# Patient Record
Sex: FEMALE | Race: OTHER | HISPANIC OR LATINO | URBAN - METROPOLITAN AREA
[De-identification: names, ages, dates, MRNs, and addresses within clinical notes are randomized per-mention and may not be internally consistent; named-entity substitution may affect disease eponyms.]

---

## 2019-07-07 ENCOUNTER — INPATIENT (INPATIENT)
Facility: HOSPITAL | Age: 24
LOS: 2 days | Discharge: HOME | End: 2019-07-10
Attending: OBSTETRICS & GYNECOLOGY | Admitting: OBSTETRICS & GYNECOLOGY
Payer: MEDICAID

## 2019-07-07 VITALS — HEIGHT: 62 IN | WEIGHT: 191.8 LBS

## 2019-07-07 DIAGNOSIS — Z34.80 ENCOUNTER FOR SUPERVISION OF OTHER NORMAL PREGNANCY, UNSPECIFIED TRIMESTER: ICD-10-CM

## 2019-07-07 DIAGNOSIS — O26.899 OTHER SPECIFIED PREGNANCY RELATED CONDITIONS, UNSPECIFIED TRIMESTER: ICD-10-CM

## 2019-07-07 DIAGNOSIS — Z3A.00 WEEKS OF GESTATION OF PREGNANCY NOT SPECIFIED: ICD-10-CM

## 2019-07-07 LAB
APTT BLD: 23.6 SEC — LOW (ref 27.5–36.3)
BASOPHILS # BLD AUTO: 0.02 K/UL — SIGNIFICANT CHANGE UP (ref 0–0.2)
BASOPHILS NFR BLD AUTO: 0.2 % — SIGNIFICANT CHANGE UP (ref 0–2)
EOSINOPHIL # BLD AUTO: 0.07 K/UL — SIGNIFICANT CHANGE UP (ref 0–0.5)
EOSINOPHIL NFR BLD AUTO: 0.7 % — SIGNIFICANT CHANGE UP (ref 0–6)
FIBRINOGEN PPP-MCNC: 782 MG/DL — HIGH (ref 350–510)
HCT VFR BLD CALC: 39.5 % — SIGNIFICANT CHANGE UP (ref 34.5–45)
HGB BLD-MCNC: 13.5 G/DL — SIGNIFICANT CHANGE UP (ref 11.5–15.5)
IMM GRANULOCYTES NFR BLD AUTO: 0.6 % — SIGNIFICANT CHANGE UP (ref 0–1.5)
INR BLD: 0.96 RATIO — SIGNIFICANT CHANGE UP (ref 0.88–1.16)
LYMPHOCYTES # BLD AUTO: 1.8 K/UL — SIGNIFICANT CHANGE UP (ref 1–3.3)
LYMPHOCYTES # BLD AUTO: 18.2 % — SIGNIFICANT CHANGE UP (ref 13–44)
MCHC RBC-ENTMCNC: 28.9 PG — SIGNIFICANT CHANGE UP (ref 27–34)
MCHC RBC-ENTMCNC: 34.2 GM/DL — SIGNIFICANT CHANGE UP (ref 32–36)
MCV RBC AUTO: 84.6 FL — SIGNIFICANT CHANGE UP (ref 80–100)
MONOCYTES # BLD AUTO: 0.73 K/UL — SIGNIFICANT CHANGE UP (ref 0–0.9)
MONOCYTES NFR BLD AUTO: 7.4 % — SIGNIFICANT CHANGE UP (ref 2–14)
NEUTROPHILS # BLD AUTO: 7.21 K/UL — SIGNIFICANT CHANGE UP (ref 1.8–7.4)
NEUTROPHILS NFR BLD AUTO: 72.9 % — SIGNIFICANT CHANGE UP (ref 43–77)
NRBC # BLD: 0 /100 WBCS — SIGNIFICANT CHANGE UP (ref 0–0)
PLATELET # BLD AUTO: 206 K/UL — SIGNIFICANT CHANGE UP (ref 150–400)
PROTHROM AB SERPL-ACNC: 10.6 SEC — SIGNIFICANT CHANGE UP (ref 10–12.9)
RBC # BLD: 4.67 M/UL — SIGNIFICANT CHANGE UP (ref 3.8–5.2)
RBC # FLD: 14 % — SIGNIFICANT CHANGE UP (ref 10.3–14.5)
WBC # BLD: 9.89 K/UL — SIGNIFICANT CHANGE UP (ref 3.8–10.5)
WBC # FLD AUTO: 9.89 K/UL — SIGNIFICANT CHANGE UP (ref 3.8–10.5)

## 2019-07-07 PROCEDURE — 76818 FETAL BIOPHYS PROFILE W/NST: CPT | Mod: 26

## 2019-07-07 PROCEDURE — 76815 OB US LIMITED FETUS(S): CPT | Mod: 26

## 2019-07-07 RX ORDER — SODIUM CHLORIDE 9 MG/ML
1000 INJECTION, SOLUTION INTRAVENOUS
Refills: 0 | Status: DISCONTINUED | OUTPATIENT
Start: 2019-07-07 | End: 2019-07-08

## 2019-07-07 RX ORDER — OXYTOCIN 10 UNIT/ML
333.33 VIAL (ML) INJECTION
Qty: 20 | Refills: 0 | Status: DISCONTINUED | OUTPATIENT
Start: 2019-07-07 | End: 2019-07-08

## 2019-07-07 RX ADMIN — SODIUM CHLORIDE 125 MILLILITER(S): 9 INJECTION, SOLUTION INTRAVENOUS at 22:10

## 2019-07-08 LAB
ABO RH CONFIRMATION: SIGNIFICANT CHANGE UP
FETAL SCREEN: SIGNIFICANT CHANGE UP
HBV SURFACE AG SERPL QL IA: SIGNIFICANT CHANGE UP
RUBV IGG SER-ACNC: 5.1 INDEX — SIGNIFICANT CHANGE UP
RUBV IGG SER-IMP: POSITIVE — SIGNIFICANT CHANGE UP
T PALLIDUM AB TITR SER: NEGATIVE — SIGNIFICANT CHANGE UP

## 2019-07-08 RX ORDER — OXYTOCIN 10 UNIT/ML
6 VIAL (ML) INJECTION
Qty: 30 | Refills: 0 | Status: DISCONTINUED | OUTPATIENT
Start: 2019-07-08 | End: 2019-07-10

## 2019-07-08 RX ORDER — IBUPROFEN 200 MG
600 TABLET ORAL EVERY 6 HOURS
Refills: 0 | Status: DISCONTINUED | OUTPATIENT
Start: 2019-07-08 | End: 2019-07-10

## 2019-07-08 RX ORDER — ONDANSETRON 8 MG/1
4 TABLET, FILM COATED ORAL EVERY 6 HOURS
Refills: 0 | Status: DISCONTINUED | OUTPATIENT
Start: 2019-07-08 | End: 2019-07-08

## 2019-07-08 RX ORDER — FERROUS SULFATE 325(65) MG
325 TABLET ORAL DAILY
Refills: 0 | Status: DISCONTINUED | OUTPATIENT
Start: 2019-07-08 | End: 2019-07-08

## 2019-07-08 RX ORDER — TETANUS TOXOID, REDUCED DIPHTHERIA TOXOID AND ACELLULAR PERTUSSIS VACCINE, ADSORBED 5; 2.5; 8; 8; 2.5 [IU]/.5ML; [IU]/.5ML; UG/.5ML; UG/.5ML; UG/.5ML
0.5 SUSPENSION INTRAMUSCULAR ONCE
Refills: 0 | Status: DISCONTINUED | OUTPATIENT
Start: 2019-07-08 | End: 2019-07-10

## 2019-07-08 RX ORDER — DOCUSATE SODIUM 100 MG
100 CAPSULE ORAL
Refills: 0 | Status: DISCONTINUED | OUTPATIENT
Start: 2019-07-08 | End: 2019-07-10

## 2019-07-08 RX ORDER — DIPHENHYDRAMINE HCL 50 MG
25 CAPSULE ORAL EVERY 6 HOURS
Refills: 0 | Status: DISCONTINUED | OUTPATIENT
Start: 2019-07-08 | End: 2019-07-10

## 2019-07-08 RX ORDER — BENZOCAINE 10 %
1 GEL (GRAM) MUCOUS MEMBRANE EVERY 6 HOURS
Refills: 0 | Status: DISCONTINUED | OUTPATIENT
Start: 2019-07-08 | End: 2019-07-10

## 2019-07-08 RX ORDER — OXYCODONE HYDROCHLORIDE 5 MG/1
5 TABLET ORAL ONCE
Refills: 0 | Status: DISCONTINUED | OUTPATIENT
Start: 2019-07-08 | End: 2019-07-10

## 2019-07-08 RX ORDER — ACETAMINOPHEN 500 MG
975 TABLET ORAL
Refills: 0 | Status: DISCONTINUED | OUTPATIENT
Start: 2019-07-08 | End: 2019-07-10

## 2019-07-08 RX ORDER — OXYTOCIN 10 UNIT/ML
333.33 VIAL (ML) INJECTION
Qty: 20 | Refills: 0 | Status: DISCONTINUED | OUTPATIENT
Start: 2019-07-08 | End: 2019-07-10

## 2019-07-08 RX ORDER — KETOROLAC TROMETHAMINE 30 MG/ML
30 SYRINGE (ML) INJECTION ONCE
Refills: 0 | Status: DISCONTINUED | OUTPATIENT
Start: 2019-07-08 | End: 2019-07-10

## 2019-07-08 RX ORDER — MAGNESIUM HYDROXIDE 400 MG/1
30 TABLET, CHEWABLE ORAL
Refills: 0 | Status: DISCONTINUED | OUTPATIENT
Start: 2019-07-08 | End: 2019-07-10

## 2019-07-08 RX ORDER — DIBUCAINE 1 %
1 OINTMENT (GRAM) RECTAL EVERY 6 HOURS
Refills: 0 | Status: DISCONTINUED | OUTPATIENT
Start: 2019-07-08 | End: 2019-07-10

## 2019-07-08 RX ORDER — HYDROCORTISONE 1 %
1 OINTMENT (GRAM) TOPICAL EVERY 6 HOURS
Refills: 0 | Status: DISCONTINUED | OUTPATIENT
Start: 2019-07-08 | End: 2019-07-10

## 2019-07-08 RX ORDER — LANOLIN
1 OINTMENT (GRAM) TOPICAL EVERY 6 HOURS
Refills: 0 | Status: DISCONTINUED | OUTPATIENT
Start: 2019-07-08 | End: 2019-07-10

## 2019-07-08 RX ORDER — AER TRAVELER 0.5 G/1
1 SOLUTION RECTAL; TOPICAL EVERY 4 HOURS
Refills: 0 | Status: DISCONTINUED | OUTPATIENT
Start: 2019-07-08 | End: 2019-07-10

## 2019-07-08 RX ORDER — PRAMOXINE HYDROCHLORIDE 150 MG/15G
1 AEROSOL, FOAM RECTAL EVERY 4 HOURS
Refills: 0 | Status: DISCONTINUED | OUTPATIENT
Start: 2019-07-08 | End: 2019-07-10

## 2019-07-08 RX ORDER — SENNA PLUS 8.6 MG/1
2 TABLET ORAL AT BEDTIME
Refills: 0 | Status: DISCONTINUED | OUTPATIENT
Start: 2019-07-08 | End: 2019-07-08

## 2019-07-08 RX ORDER — SODIUM CHLORIDE 9 MG/ML
3 INJECTION INTRAMUSCULAR; INTRAVENOUS; SUBCUTANEOUS EVERY 8 HOURS
Refills: 0 | Status: DISCONTINUED | OUTPATIENT
Start: 2019-07-08 | End: 2019-07-10

## 2019-07-08 RX ORDER — SIMETHICONE 80 MG/1
80 TABLET, CHEWABLE ORAL EVERY 4 HOURS
Refills: 0 | Status: DISCONTINUED | OUTPATIENT
Start: 2019-07-08 | End: 2019-07-10

## 2019-07-08 RX ADMIN — Medication 1 APPLICATION(S): at 19:27

## 2019-07-08 RX ADMIN — SODIUM CHLORIDE 125 MILLILITER(S): 9 INJECTION, SOLUTION INTRAVENOUS at 08:07

## 2019-07-08 RX ADMIN — Medication 975 MILLIGRAM(S): at 17:51

## 2019-07-08 RX ADMIN — Medication 975 MILLIGRAM(S): at 21:43

## 2019-07-08 RX ADMIN — Medication 100 MILLIGRAM(S): at 17:51

## 2019-07-08 RX ADMIN — SODIUM CHLORIDE 3 MILLILITER(S): 9 INJECTION INTRAMUSCULAR; INTRAVENOUS; SUBCUTANEOUS at 14:55

## 2019-07-08 RX ADMIN — Medication 1000 MILLIUNIT(S)/MIN: at 11:14

## 2019-07-08 RX ADMIN — Medication 975 MILLIGRAM(S): at 21:14

## 2019-07-08 RX ADMIN — Medication 6 MILLIUNIT(S)/MIN: at 10:54

## 2019-07-08 RX ADMIN — Medication 975 MILLIGRAM(S): at 18:46

## 2019-07-08 RX ADMIN — Medication 1 SPRAY(S): at 17:47

## 2019-07-08 RX ADMIN — SODIUM CHLORIDE 3 MILLILITER(S): 9 INJECTION INTRAMUSCULAR; INTRAVENOUS; SUBCUTANEOUS at 22:00

## 2019-07-08 RX ADMIN — AER TRAVELER 1 APPLICATION(S): 0.5 SOLUTION RECTAL; TOPICAL at 19:28

## 2019-07-09 LAB
HCT VFR BLD CALC: 34.9 % — SIGNIFICANT CHANGE UP (ref 34.5–45)
HGB BLD-MCNC: 11.8 G/DL — SIGNIFICANT CHANGE UP (ref 11.5–15.5)
MCHC RBC-ENTMCNC: 28.9 PG — SIGNIFICANT CHANGE UP (ref 27–34)
MCHC RBC-ENTMCNC: 33.8 GM/DL — SIGNIFICANT CHANGE UP (ref 32–36)
MCV RBC AUTO: 85.3 FL — SIGNIFICANT CHANGE UP (ref 80–100)
NRBC # BLD: 0 /100 WBCS — SIGNIFICANT CHANGE UP (ref 0–0)
PLATELET # BLD AUTO: 191 K/UL — SIGNIFICANT CHANGE UP (ref 150–400)
RBC # BLD: 4.09 M/UL — SIGNIFICANT CHANGE UP (ref 3.8–5.2)
RBC # FLD: 14.5 % — SIGNIFICANT CHANGE UP (ref 10.3–14.5)
WBC # BLD: 20.09 K/UL — HIGH (ref 3.8–10.5)
WBC # FLD AUTO: 20.09 K/UL — HIGH (ref 3.8–10.5)

## 2019-07-09 RX ADMIN — Medication 975 MILLIGRAM(S): at 20:19

## 2019-07-09 RX ADMIN — Medication 100 MILLIGRAM(S): at 06:14

## 2019-07-09 RX ADMIN — Medication 975 MILLIGRAM(S): at 03:15

## 2019-07-09 RX ADMIN — SODIUM CHLORIDE 3 MILLILITER(S): 9 INJECTION INTRAMUSCULAR; INTRAVENOUS; SUBCUTANEOUS at 14:34

## 2019-07-09 RX ADMIN — Medication 975 MILLIGRAM(S): at 02:41

## 2019-07-09 RX ADMIN — SODIUM CHLORIDE 3 MILLILITER(S): 9 INJECTION INTRAMUSCULAR; INTRAVENOUS; SUBCUTANEOUS at 06:00

## 2019-07-09 RX ADMIN — SODIUM CHLORIDE 3 MILLILITER(S): 9 INJECTION INTRAMUSCULAR; INTRAVENOUS; SUBCUTANEOUS at 23:26

## 2019-07-09 RX ADMIN — Medication 1 TABLET(S): at 13:02

## 2019-07-09 RX ADMIN — Medication 975 MILLIGRAM(S): at 20:50

## 2019-07-09 NOTE — DISCHARGE NOTE OB - CARE PROVIDER_API CALL
Trina Felix)  Obstetrics and Gynecology  [address] [place] [address]  Phone: [phone]  Fax: [phone]  Follow Up Time: Routine

## 2019-07-09 NOTE — DISCHARGE NOTE OB - MEDICATION SUMMARY - MEDICATIONS TO TAKE
I will START or STAY ON the medications listed below when I get home from the hospital:    ibuprofen 600 mg oral tablet  -- 1 tab(s) by mouth every 6 hours, As Needed   -- Do not take this drug if you are pregnant.  It is very important that you take or use this exactly as directed.  Do not skip doses or discontinue unless directed by your doctor.  May cause drowsiness or dizziness.  Obtain medical advice before taking any non-prescription drugs as some may affect the action of this medication.  Take with food or milk.    -- Indication: For pain    Prenatal Multivitamins with Folic Acid 1 mg oral tablet  -- 1 tab(s) by mouth once a day  -- Indication: For breast feeding    Colace 100 mg oral capsule  -- 1 cap(s) by mouth 2 times a day, As Needed   -- Medication should be taken with plenty of water.    -- Indication: For Stool softener

## 2019-07-09 NOTE — DISCHARGE NOTE OB - MATERIALS PROVIDED
MMR Vaccination (VIS Pub Date: 2012)/Shaken Baby Prevention Handout/Birth Certificate Instructions/Letter of Medical Neccessity/Discharge Medication Information for Patients and Families Pocket Guide/Vaccinations/Breastfeeding Mother’s Support Group Information/Guide to Postpartum Care/[facility] Hearing Screen Program/Back To Sleep Handout/Breastfeeding Guide and Packet/[facility] [place] Screening Program/Breastfeeding Log/  Immunization Record

## 2019-07-09 NOTE — PROGRESS NOTE ADULT - ASSESSMENT
A/P:  23y F Postpartum day #1 s/p  @ 40w3d currently in stable condition  -Continue pain management  -Encourage OOB and ambulation  - CBC in am  -Continue post partum care  -Plan for discharge tomorrow.

## 2019-07-09 NOTE — DISCHARGE NOTE OB - HOSPITAL COURSE
patient admitted for medical induction of labor for non reassuring fetal tracing  patient delivered vaginally, uncomplicated.  routine post partum care given patient admitted for medical induction of labor for non reassuring fetal tracing  patient delivered vaginally, uncomplicated.  patient noted with leukocytosis postpartum  hemodynamically stable for discharge home PPD#2 with appropriate follow up

## 2019-07-09 NOTE — DISCHARGE NOTE OB - CARE PLAN
Principal Discharge DX:	Normal spontaneous vaginal delivery  Goal:	pain management and encourage breast feeding  Assessment and plan of treatment:	Continue prenatal vitamins while breastfeeding. Pelvic rest.  No sexual intercourse and  nothing in vagina - tampons, douching.  No heavy lifting or no strenuous activity.  Motrin as needed for pain. Follow up in office in 5-6 weeks for post partum check up. Please call for appt. Principal Discharge DX:	Normal spontaneous vaginal delivery  Goal:	pain management and encourage breast feeding  Assessment and plan of treatment:	Continue prenatal vitamins while breastfeeding. Pelvic rest.  No sexual intercourse and  nothing in vagina - tampons, douching.  No heavy lifting or no strenuous activity.  Motrin as needed for pain. Follow up in office in 5-6 weeks for post partum check up. Please call for appt.  Secondary Diagnosis:	Leukocytosis  Goal:	Follow up in 1 week for repeat CBC  Assessment and plan of treatment:	If you develop fever, chills, worsening pain, foul smelling vaginal discharge, please contact your physician and return to Emergency Department for evaluation.

## 2019-07-09 NOTE — DISCHARGE NOTE OB - PATIENT PORTAL LINK FT
You can access the [facility] Patient Portal, offered by [facility], by registering with the following website: [email or web address]

## 2019-07-09 NOTE — PROGRESS NOTE ADULT - SUBJECTIVE AND OBJECTIVE BOX
OBGYN PA NOTE PPD1  Patient seen and evaluated at bedside,  resting comfortably w/o any acute  complaints.  Denies fever, HA, CP, SOB, N/V/D, dizziness, palpitations, worsening abdominal pain, worsening vaginal bleeding, or any other concerns.  Ambulating and voiding without difficulty.  Passing flatus and tolerating regular diet.  Attempting to breastfeed.     Vital Signs Last 24 Hrs  T(C): 37.1 (2019 04:30), Max: 37.1 (2019 04:30)  T(F): 98.7 (2019 04:30), Max: 98.7 (2019 04:30)  HR: 100 (2019 04:30) (90 - 120)  BP: 121/75 (2019 04:30) (103/56 - 128/80)  BP(mean): --  RR: 16 (2019 04:30) (16 - 18)  SpO2: 98% (2019 04:30) (97% - 100%)    Physical Exam:     Gen: A&Ox 3, NAD  Chest: CTAB/L  Cardiac: S1+S2+ RRR  Breast: Soft, nontender, nonengorged  Abdomen: Soft, nontender, Fundus firm below umbilicus, +BS  Gyn: Mild lochia, intact/repaired  Extremities: Nontender, DTRS 2+, no worsening edema                          11.8   20.09 )-----------( 191      ( 2019 06:32 )             34.9       A/P:  23y F Postpartum day #1 s/p  @ 40w3d currently in stable condition  -Continue pain management  -Encourage OOB and ambulation  - CBC in am  -Continue post partum care  -Plan for discharge tomorrow.     d/w Dr Coates, house attending

## 2019-07-09 NOTE — DISCHARGE NOTE OB - PLAN OF CARE
pain management and encourage breast feeding Continue prenatal vitamins while breastfeeding. Pelvic rest.  No sexual intercourse and  nothing in vagina - tampons, douching.  No heavy lifting or no strenuous activity.  Motrin as needed for pain. Follow up in office in 5-6 weeks for post partum check up. Please call for appt. Follow up in 1 week for repeat CBC If you develop fever, chills, worsening pain, foul smelling vaginal discharge, please contact your physician and return to Emergency Department for evaluation.

## 2019-07-10 VITALS
OXYGEN SATURATION: 100 % | SYSTOLIC BLOOD PRESSURE: 121 MMHG | HEART RATE: 92 BPM | DIASTOLIC BLOOD PRESSURE: 80 MMHG | RESPIRATION RATE: 17 BRPM | TEMPERATURE: 98 F

## 2019-07-10 DIAGNOSIS — D72.829 ELEVATED WHITE BLOOD CELL COUNT, UNSPECIFIED: ICD-10-CM

## 2019-07-10 LAB
BASOPHILS # BLD AUTO: 0.07 K/UL — SIGNIFICANT CHANGE UP (ref 0–0.2)
BASOPHILS NFR BLD AUTO: 0.3 % — SIGNIFICANT CHANGE UP (ref 0–2)
EOSINOPHIL # BLD AUTO: 0.15 K/UL — SIGNIFICANT CHANGE UP (ref 0–0.5)
EOSINOPHIL NFR BLD AUTO: 0.6 % — SIGNIFICANT CHANGE UP (ref 0–6)
HCT VFR BLD CALC: 35.6 % — SIGNIFICANT CHANGE UP (ref 34.5–45)
HGB BLD-MCNC: 11.8 G/DL — SIGNIFICANT CHANGE UP (ref 11.5–15.5)
IMM GRANULOCYTES NFR BLD AUTO: 1.2 % — SIGNIFICANT CHANGE UP (ref 0–1.5)
LYMPHOCYTES # BLD AUTO: 13.3 % — SIGNIFICANT CHANGE UP (ref 13–44)
LYMPHOCYTES # BLD AUTO: 3.2 K/UL — SIGNIFICANT CHANGE UP (ref 1–3.3)
MCHC RBC-ENTMCNC: 28.8 PG — SIGNIFICANT CHANGE UP (ref 27–34)
MCHC RBC-ENTMCNC: 33.1 GM/DL — SIGNIFICANT CHANGE UP (ref 32–36)
MCV RBC AUTO: 86.8 FL — SIGNIFICANT CHANGE UP (ref 80–100)
MONOCYTES # BLD AUTO: 1.39 K/UL — HIGH (ref 0–0.9)
MONOCYTES NFR BLD AUTO: 5.8 % — SIGNIFICANT CHANGE UP (ref 2–14)
NEUTROPHILS # BLD AUTO: 18.92 K/UL — HIGH (ref 1.8–7.4)
NEUTROPHILS NFR BLD AUTO: 78.8 % — HIGH (ref 43–77)
NRBC # BLD: 0 /100 WBCS — SIGNIFICANT CHANGE UP (ref 0–0)
PLATELET # BLD AUTO: 205 K/UL — SIGNIFICANT CHANGE UP (ref 150–400)
RBC # BLD: 4.1 M/UL — SIGNIFICANT CHANGE UP (ref 3.8–5.2)
RBC # FLD: 14.6 % — HIGH (ref 10.3–14.5)
WBC # BLD: 24.01 K/UL — HIGH (ref 3.8–10.5)
WBC # FLD AUTO: 24.01 K/UL — HIGH (ref 3.8–10.5)

## 2019-07-10 PROCEDURE — 86850 RBC ANTIBODY SCREEN: CPT

## 2019-07-10 PROCEDURE — 85461 HEMOGLOBIN FETAL: CPT

## 2019-07-10 PROCEDURE — 86780 TREPONEMA PALLIDUM: CPT

## 2019-07-10 PROCEDURE — 85384 FIBRINOGEN ACTIVITY: CPT

## 2019-07-10 PROCEDURE — G0463: CPT

## 2019-07-10 PROCEDURE — 59025 FETAL NON-STRESS TEST: CPT

## 2019-07-10 PROCEDURE — 36415 COLL VENOUS BLD VENIPUNCTURE: CPT

## 2019-07-10 PROCEDURE — 85027 COMPLETE CBC AUTOMATED: CPT

## 2019-07-10 PROCEDURE — 86901 BLOOD TYPING SEROLOGIC RH(D): CPT

## 2019-07-10 PROCEDURE — 85610 PROTHROMBIN TIME: CPT

## 2019-07-10 PROCEDURE — 76818 FETAL BIOPHYS PROFILE W/NST: CPT

## 2019-07-10 PROCEDURE — 86870 RBC ANTIBODY IDENTIFICATION: CPT

## 2019-07-10 PROCEDURE — 85730 THROMBOPLASTIN TIME PARTIAL: CPT

## 2019-07-10 PROCEDURE — 86900 BLOOD TYPING SEROLOGIC ABO: CPT

## 2019-07-10 PROCEDURE — 59050 FETAL MONITOR W/REPORT: CPT

## 2019-07-10 PROCEDURE — 87340 HEPATITIS B SURFACE AG IA: CPT

## 2019-07-10 PROCEDURE — 76815 OB US LIMITED FETUS(S): CPT

## 2019-07-10 PROCEDURE — 86922 COMPATIBILITY TEST ANTIGLOB: CPT

## 2019-07-10 PROCEDURE — 86762 RUBELLA ANTIBODY: CPT

## 2019-07-10 RX ORDER — DOCUSATE SODIUM 100 MG
1 CAPSULE ORAL
Qty: 60 | Refills: 0
Start: 2019-07-10 | End: 2019-08-08

## 2019-07-10 RX ORDER — IBUPROFEN 200 MG
1 TABLET ORAL
Qty: 20 | Refills: 0
Start: 2019-07-10 | End: 2019-07-14

## 2019-07-10 RX ADMIN — Medication 600 MILLIGRAM(S): at 06:28

## 2019-07-10 RX ADMIN — Medication 600 MILLIGRAM(S): at 07:00

## 2019-07-10 RX ADMIN — Medication 100 MILLIGRAM(S): at 06:28

## 2019-07-10 RX ADMIN — SODIUM CHLORIDE 3 MILLILITER(S): 9 INJECTION INTRAMUSCULAR; INTRAVENOUS; SUBCUTANEOUS at 05:28

## 2019-07-10 NOTE — PROGRESS NOTE ADULT - PROBLEM SELECTOR PLAN 2
- Discharge home with instructions  - Follow up in 1 week for repeat CBC due to elevated WBC count, patient stable for discharge as she is asymptomatic and afebrile   - Dr. Felix's office made aware that patient being discharged with leukocytosis and will need a repeat CBC next week  - Follow up in office in 5-6 weeks for postpartum visit  - Breastfeeding encouraged   - Ibuprofen as needed for pain  - D/w yoli Woods attending

## 2019-07-10 NOTE — PROGRESS NOTE ADULT - SUBJECTIVE AND OBJECTIVE BOX
Patient seen at bedside resting comfortably offers no current complaints.  Ambulating and voiding without difficulty.  Passing flatus and tolerating regular diet.  Both breast/bottle feeding.  Denies CP, SOB, N/V/D, dizziness, palpitations, worsening abdominal pain, worsening vaginal bleeding, or any other concerns.      Vital Signs Last 24 Hrs  T(C): 36.9 (10 Jul 2019 05:16), Max: 37.7 (09 Jul 2019 18:33)  T(F): 98.4 (10 Jul 2019 05:16), Max: 99.8 (09 Jul 2019 18:33)  HR: 92 (10 Jul 2019 05:16) (92 - 128)  BP: 121/80 (10 Jul 2019 05:16) (120/79 - 121/80)  BP(mean): --  RR: 17 (10 Jul 2019 05:16) (17 - 17)  SpO2: 100% (10 Jul 2019 05:16) (99% - 100%)    Physical Exam:   Gen: A&Ox 3, NAD  Chest: CTA B/L  Cardiac: S1,S2; RRR  Abdomen: +BS; Soft, nontender, ND; Fundus firm below umbilicus  Gyn: Min lochia  Ext: Nontender, no worsening edema                          11.8   24.01 )-----------( 205      ( 10 Jul 2019 06:55 )             35.6

## 2021-11-17 NOTE — PATIENT PROFILE OB - ABILITY TO HEAR (WITH HEARING AID OR HEARING APPLIANCE IF NORMALLY USED):
PLEASE CALL, NOT CLEAR WHY SHE NEEDS TO HAVE ULTRASOUND DONE   Adequate: hears normal conversation without difficulty

## 2022-05-11 NOTE — DISCHARGE NOTE OB - BREAST MILK IS MORE DIGESTIBLE, MAKING VOMITING, DIARRHEA, GAS AND CONSTIPATION LESS COMMON
Pre-Excision Curettage Text (Leave Blank If You Do Not Want): Prior to drawing the surgical margin the visible lesion was removed with electrodesiccation and curettage to clearly define the lesion size. Statement Selected
